# Patient Record
Sex: MALE | Race: WHITE
[De-identification: names, ages, dates, MRNs, and addresses within clinical notes are randomized per-mention and may not be internally consistent; named-entity substitution may affect disease eponyms.]

---

## 2017-09-08 ENCOUNTER — HOSPITAL ENCOUNTER (EMERGENCY)
Dept: HOSPITAL 8 - ED | Age: 42
Discharge: HOME | End: 2017-09-08
Payer: COMMERCIAL

## 2017-09-08 VITALS — SYSTOLIC BLOOD PRESSURE: 112 MMHG | DIASTOLIC BLOOD PRESSURE: 83 MMHG

## 2017-09-08 VITALS — WEIGHT: 132.28 LBS | BODY MASS INDEX: 21.26 KG/M2 | HEIGHT: 66 IN

## 2017-09-08 DIAGNOSIS — F10.10: ICD-10-CM

## 2017-09-08 DIAGNOSIS — M25.551: ICD-10-CM

## 2017-09-08 DIAGNOSIS — Z96.641: ICD-10-CM

## 2017-09-08 DIAGNOSIS — G89.29: Primary | ICD-10-CM

## 2017-09-08 PROCEDURE — 99283 EMERGENCY DEPT VISIT LOW MDM: CPT

## 2017-09-12 ENCOUNTER — HOSPITAL ENCOUNTER (EMERGENCY)
Dept: HOSPITAL 8 - ED | Age: 42
Discharge: HOME | End: 2017-09-12
Payer: SELF-PAY

## 2017-09-12 VITALS — SYSTOLIC BLOOD PRESSURE: 123 MMHG | DIASTOLIC BLOOD PRESSURE: 81 MMHG

## 2017-09-12 VITALS — HEIGHT: 68 IN | WEIGHT: 132.28 LBS | BODY MASS INDEX: 20.05 KG/M2

## 2017-09-12 DIAGNOSIS — M91.11: ICD-10-CM

## 2017-09-12 DIAGNOSIS — M25.551: ICD-10-CM

## 2017-09-12 DIAGNOSIS — G89.29: Primary | ICD-10-CM

## 2017-09-12 DIAGNOSIS — M25.562: ICD-10-CM

## 2017-09-12 DIAGNOSIS — M25.561: ICD-10-CM

## 2017-09-12 LAB
AST SERPL-CCNC: 42 U/L (ref 15–37)
BUN SERPL-MCNC: 14 MG/DL (ref 7–18)
HCT VFR BLD CALC: 40.7 % (ref 39.2–51.8)
HGB BLD-MCNC: 13.9 G/DL (ref 13.7–18)
WBC # BLD AUTO: 6.8 X10^3/UL (ref 3.4–10)

## 2017-09-12 PROCEDURE — 80307 DRUG TEST PRSMV CHEM ANLYZR: CPT

## 2017-09-12 PROCEDURE — 99285 EMERGENCY DEPT VISIT HI MDM: CPT

## 2017-09-12 PROCEDURE — 85730 THROMBOPLASTIN TIME PARTIAL: CPT

## 2017-09-12 PROCEDURE — 80053 COMPREHEN METABOLIC PANEL: CPT

## 2017-09-12 PROCEDURE — 85610 PROTHROMBIN TIME: CPT

## 2017-09-12 PROCEDURE — 85025 COMPLETE CBC W/AUTO DIFF WBC: CPT

## 2017-09-12 PROCEDURE — 36415 COLL VENOUS BLD VENIPUNCTURE: CPT

## 2017-09-14 ENCOUNTER — HOSPITAL ENCOUNTER (EMERGENCY)
Dept: HOSPITAL 8 - ED | Age: 42
Discharge: HOME | End: 2017-09-14
Payer: SELF-PAY

## 2017-09-14 VITALS — SYSTOLIC BLOOD PRESSURE: 133 MMHG | DIASTOLIC BLOOD PRESSURE: 81 MMHG

## 2017-09-14 VITALS — WEIGHT: 131.62 LBS | BODY MASS INDEX: 21.93 KG/M2 | HEIGHT: 65 IN

## 2017-09-14 DIAGNOSIS — F17.200: ICD-10-CM

## 2017-09-14 DIAGNOSIS — M25.551: Primary | ICD-10-CM

## 2017-09-14 DIAGNOSIS — Z96.641: ICD-10-CM

## 2017-09-14 DIAGNOSIS — M25.561: ICD-10-CM

## 2017-09-14 PROCEDURE — 99283 EMERGENCY DEPT VISIT LOW MDM: CPT

## 2017-09-15 ENCOUNTER — HOSPITAL ENCOUNTER (EMERGENCY)
Dept: HOSPITAL 8 - ED | Age: 42
Discharge: HOME | End: 2017-09-15
Payer: COMMERCIAL

## 2017-09-15 VITALS — DIASTOLIC BLOOD PRESSURE: 78 MMHG | SYSTOLIC BLOOD PRESSURE: 113 MMHG

## 2017-09-15 VITALS — WEIGHT: 130.51 LBS | HEIGHT: 66 IN | BODY MASS INDEX: 20.98 KG/M2

## 2017-09-15 DIAGNOSIS — Z59.0: ICD-10-CM

## 2017-09-15 DIAGNOSIS — Z96.641: ICD-10-CM

## 2017-09-15 DIAGNOSIS — G89.29: Primary | ICD-10-CM

## 2017-09-15 DIAGNOSIS — M25.551: ICD-10-CM

## 2017-09-15 PROCEDURE — 99282 EMERGENCY DEPT VISIT SF MDM: CPT

## 2017-09-15 SDOH — ECONOMIC STABILITY - HOUSING INSECURITY: HOMELESSNESS: Z59.0

## 2017-09-16 ENCOUNTER — HOSPITAL ENCOUNTER (EMERGENCY)
Dept: HOSPITAL 8 - ED | Age: 42
Discharge: LEFT BEFORE DISCHARGE | End: 2017-09-16
Payer: COMMERCIAL

## 2017-09-16 VITALS — HEIGHT: 66 IN | WEIGHT: 139.55 LBS | BODY MASS INDEX: 22.43 KG/M2

## 2017-09-16 VITALS — SYSTOLIC BLOOD PRESSURE: 107 MMHG | DIASTOLIC BLOOD PRESSURE: 75 MMHG

## 2017-09-16 DIAGNOSIS — M25.551: Primary | ICD-10-CM

## 2017-09-16 DIAGNOSIS — M79.651: ICD-10-CM

## 2017-09-16 DIAGNOSIS — Z96.641: ICD-10-CM

## 2017-09-16 DIAGNOSIS — M25.561: ICD-10-CM

## 2017-09-16 PROCEDURE — 99281 EMR DPT VST MAYX REQ PHY/QHP: CPT

## 2017-10-05 ENCOUNTER — HOSPITAL ENCOUNTER (EMERGENCY)
Dept: HOSPITAL 8 - ED | Age: 42
Discharge: HOME | End: 2017-10-05
Payer: MEDICAID

## 2017-10-05 VITALS — DIASTOLIC BLOOD PRESSURE: 98 MMHG | SYSTOLIC BLOOD PRESSURE: 164 MMHG

## 2017-10-05 DIAGNOSIS — G89.29: ICD-10-CM

## 2017-10-05 DIAGNOSIS — F41.1: Primary | ICD-10-CM

## 2017-10-05 DIAGNOSIS — Z59.0: ICD-10-CM

## 2017-10-05 DIAGNOSIS — F10.229: ICD-10-CM

## 2017-10-05 DIAGNOSIS — Z96.641: ICD-10-CM

## 2017-10-05 DIAGNOSIS — R51: ICD-10-CM

## 2017-10-05 DIAGNOSIS — M25.579: ICD-10-CM

## 2017-10-05 PROCEDURE — 99284 EMERGENCY DEPT VISIT MOD MDM: CPT

## 2017-10-05 SDOH — ECONOMIC STABILITY - HOUSING INSECURITY: HOMELESSNESS: Z59.0

## 2017-10-10 ENCOUNTER — HOSPITAL ENCOUNTER (EMERGENCY)
Facility: MEDICAL CENTER | Age: 42
End: 2017-10-10
Attending: EMERGENCY MEDICINE
Payer: COMMERCIAL

## 2017-10-10 VITALS
BODY MASS INDEX: 22.49 KG/M2 | HEIGHT: 65 IN | SYSTOLIC BLOOD PRESSURE: 113 MMHG | HEART RATE: 84 BPM | WEIGHT: 135 LBS | TEMPERATURE: 99.4 F | DIASTOLIC BLOOD PRESSURE: 70 MMHG | RESPIRATION RATE: 16 BRPM | OXYGEN SATURATION: 98 %

## 2017-10-10 DIAGNOSIS — G89.4 CHRONIC PAIN SYNDROME: ICD-10-CM

## 2017-10-10 DIAGNOSIS — Z59.00 HOMELESSNESS: ICD-10-CM

## 2017-10-10 PROCEDURE — 99284 EMERGENCY DEPT VISIT MOD MDM: CPT

## 2017-10-10 SDOH — ECONOMIC STABILITY - HOUSING INSECURITY: HOMELESSNESS UNSPECIFIED: Z59.00

## 2017-10-10 NOTE — ED NOTES
Chief Complaint   Patient presents with   • Leg Pain     right   • Hip Pain     right     Patient homeless, has multiple complaints. States he has left leg and hip pain. States he needs social work help. Patient denies SI and HI. VSS.

## 2017-10-11 NOTE — ED PROVIDER NOTES
"ED Provider Note    Scribed for Jailyn Bennett M.D. by Katherine Patiño. 10/10/2017  5:50 PM    Primary care provider: Pcp Pt States None  Means of arrival: walk-in   History obtained from: patient  History limited by: None      CHIEF COMPLAINT  Chief Complaint   Patient presents with   • Leg Pain     right   • Hip Pain     right     HPI  Julio C Nam is a 42 y.o. male who presents to the Emergency Department for chronic right leg pain. Patient is not complaining of leg pain at this time. Denies trauma to the leg. Denies alcohol use or drug use. Patient reports he is homeless and disabled and would like a social work help. Denies fever, chest pain, abdominal pain, shortness of breath.  He has no other complaints at this time.     REVIEW OF SYSTEMS  CARDIAC: no chest pain, no palpitations    GI: no vomiting, diarrhea, or abdominal pain   : no dysuria, back pain, or hematuria   Neuro: no weakness, numbness, aphasia, or headache  Musculoskeletal: chronic left leg pain, no swelling, deformity,  or joint swelling  Endocrine: no fevers, sweating, or weight loss   SKIN: no rash, erythema, or contusions     See history of present illness.     PAST MEDICAL HISTORY   has a past medical history of Depression; Psychiatric disorder; and Renal disorder.    SURGICAL HISTORY   has a past surgical history that includes other orthopedic surgery.    SOCIAL HISTORY  Social History   Substance Use Topics   • Smoking status: Current Every Day Smoker     Packs/day: 1.00     Types: Cigarettes      Comment: 4 cigarettes per day   • Alcohol use Yes      Comment: occasionally      History   Drug Use No     Comment: prior drug use per patient     FAMILY HISTORY  Patient does not report pertinent family history.     CURRENT MEDICATIONS  Patient does not report current medications.     ALLERGIES  No Known Allergies    PHYSICAL EXAM  VITAL SIGNS: /70   Pulse 84   Temp 37.4 °C (99.4 °F)   Resp 16   Ht 1.651 m (5' 5\")   Wt 61.2 kg " (135 lb)   SpO2 98%   BMI 22.47 kg/m²     Constitutional: unkept, No acute distress, Non-toxic appearance. Sleeping in no distress. On exam  HEENT: Normocephalic, Atraumatic,  external ears normal, pharynx pink,  Mucous  Membranes moist, No rhinorrhea or mucosal edema  Eyes: PERRL, EOMI, Conjunctiva normal, No discharge.   Cardiovascular: Regular Rate and Rhythm, No murmurs,  rubs, or gallops.   Thorax & Lungs: Lungs clear to auscultation bilaterally, No respiratory distress, No wheezes, rhales or rhonchi, No chest wall tenderness.   Abdomen: Bowel sounds normal, Soft, non tender, non distended,  No pulsatile masses., no rebound guarding or peritoneal signs.   Skin: Abrasion to left cheek, Warm, Dry, No erythema, No rash,   Extremities: Equal, intact distal pulses, No cyanosis, No tenderness.   Musculoskeletal: Hip pain is chronic, Good range of motion in all major joints. No tenderness to palpation or major deformities noted.   Neurologic: Alert & awake, no focal deficits  Psychiatric: Affect normal    COURSE & MEDICAL DECISION MAKING  Nursing notes, VS, PMSFHx reviewed in chart.     5:50 PM - Patient seen and examined at bedside. Patient continually falls asleep during evaluation. Patient does not have any complaints at this time. He requested a consult with social work as he is currently homeless.      The patient will return for new or worsening symptoms and is stable at the time of discharge.      DISPOSITION:  Patient will be discharged home in stable condition.    FOLLOW UP:  The patient is given a place at the homeless shelter [facility].  has seen him and he is stable for discharge.    OUTPATIENT MEDICATIONS:  None    FINAL IMPRESSION  1. Chronic pain syndrome    2. Homelessness          Katherine CALVO (Gareth), am scribing for, and in the presence of, Jailyn Bennett M.D..    Electronically signed by: Katherine Patiño (Gareth), 10/10/2017    Jailyn CALVO M.D. personally performed  the services described in this documentation, as scribed by Katherine Patiño in my presence, and it is both accurate and complete.    The note accurately reflects work and decisions made by me.  Jailyn Bennett  10/10/2017  9:13 PM

## 2017-10-11 NOTE — ED NOTES
Pt given resources and bus pass from social work. Pt given discharge instructions. Pt verbalized understanding. VSS no acute distress noted, pt able to ambulate with minimal difficulty.
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
## 2017-10-12 ENCOUNTER — HOSPITAL ENCOUNTER (EMERGENCY)
Dept: HOSPITAL 8 - ED | Age: 42
Discharge: LEFT BEFORE DISCHARGE | End: 2017-10-12
Payer: MEDICAID

## 2017-10-12 VITALS — BODY MASS INDEX: 22.66 KG/M2 | WEIGHT: 136.03 LBS | HEIGHT: 65 IN

## 2017-10-12 VITALS — SYSTOLIC BLOOD PRESSURE: 129 MMHG | DIASTOLIC BLOOD PRESSURE: 89 MMHG

## 2017-10-12 DIAGNOSIS — Z96.641: ICD-10-CM

## 2017-10-12 DIAGNOSIS — M25.551: ICD-10-CM

## 2017-10-12 DIAGNOSIS — G89.29: Primary | ICD-10-CM

## 2017-10-12 PROCEDURE — 99283 EMERGENCY DEPT VISIT LOW MDM: CPT

## 2017-10-12 PROCEDURE — 99281 EMR DPT VST MAYX REQ PHY/QHP: CPT

## 2017-10-13 ENCOUNTER — HOSPITAL ENCOUNTER (EMERGENCY)
Facility: MEDICAL CENTER | Age: 42
End: 2017-10-13
Attending: EMERGENCY MEDICINE
Payer: COMMERCIAL

## 2017-10-13 VITALS
RESPIRATION RATE: 19 BRPM | WEIGHT: 136.91 LBS | DIASTOLIC BLOOD PRESSURE: 82 MMHG | HEART RATE: 87 BPM | SYSTOLIC BLOOD PRESSURE: 122 MMHG | TEMPERATURE: 97.4 F | HEIGHT: 66 IN | OXYGEN SATURATION: 96 % | BODY MASS INDEX: 22 KG/M2

## 2017-10-13 DIAGNOSIS — Z76.5 DRUG-SEEKING BEHAVIOR: ICD-10-CM

## 2017-10-13 DIAGNOSIS — Z59.00 HOMELESSNESS: ICD-10-CM

## 2017-10-13 DIAGNOSIS — R46.89 OPPOSITIONAL DEFIANT BEHAVIOR: ICD-10-CM

## 2017-10-13 SDOH — ECONOMIC STABILITY - HOUSING INSECURITY: HOMELESSNESS UNSPECIFIED: Z59.00

## 2017-10-13 NOTE — DISCHARGE INSTRUCTIONS
Chronic Pain  Chronic pain can be defined as pain that is off and on and lasts for 3-6 months or longer. Many things cause chronic pain, which can make it difficult to make a diagnosis. There are many treatment options available for chronic pain. However, finding a treatment that works well for you may require trying various approaches until the right one is found. Many people benefit from a combination of two or more types of treatment to control their pain.  SYMPTOMS   Chronic pain can occur anywhere in the body and can range from mild to very severe. Some types of chronic pain include:  · Headache.  · Low back pain.  · Cancer pain.  · Arthritis pain.  · Neurogenic pain. This is pain resulting from damage to nerves.   People with chronic pain may also have other symptoms such as:  · Depression.  · Anger.  · Insomnia.  · Anxiety.  DIAGNOSIS   Your health care provider will help diagnose your condition over time. In many cases, the initial focus will be on excluding possible conditions that could be causing the pain. Depending on your symptoms, your health care provider may order tests to diagnose your condition. Some of these tests may include:   · Blood tests.    · CT scan.    · MRI.    · X-rays.    · Ultrasounds.    · Nerve conduction studies.    You may need to see a specialist.   TREATMENT   Finding treatment that works well may take time. You may be referred to a pain specialist. He or she may prescribe medicine or therapies, such as:   · Mindful meditation or yoga.  · Shots (injections) of numbing or pain-relieving medicines into the spine or area of pain.  · Local electrical stimulation.  · Acupuncture.    · Massage therapy.    · Aroma, color, light, or sound therapy.    · Biofeedback.    · Working with a physical therapist to keep from getting stiff.    · Regular, gentle exercise.    · Cognitive or behavioral therapy.    · Group support.    Sometimes, surgery may be recommended.   HOME CARE INSTRUCTIONS    · Take all medicines as directed by your health care provider.    · Lessen stress in your life by relaxing and doing things such as listening to calming music.    · Exercise or be active as directed by your health care provider.    · Eat a healthy diet and include things such as vegetables, fruits, fish, and lean meats in your diet.    · Keep all follow-up appointments with your health care provider.    · Attend a support group with others suffering from chronic pain.  SEEK MEDICAL CARE IF:   · Your pain gets worse.    · You develop a new pain that was not there before.    · You cannot tolerate medicines given to you by your health care provider.    · You have new symptoms since your last visit with your health care provider.    SEEK IMMEDIATE MEDICAL CARE IF:   · You feel weak.    · You have decreased sensation or numbness.    · You lose control of bowel or bladder function.    · Your pain suddenly gets much worse.    · You develop shaking.  · You develop chills.  · You develop confusion.  · You develop chest pain.  · You develop shortness of breath.    MAKE SURE YOU:  · Understand these instructions.  · Will watch your condition.  · Will get help right away if you are not doing well or get worse.     This information is not intended to replace advice given to you by your health care provider. Make sure you discuss any questions you have with your health care provider.     Document Released: 09/09/2003 Document Revised: 08/20/2014 Document Reviewed: 06/13/2014  [employer] Interactive Patient Education ©2016 [employer]
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
## 2017-10-13 NOTE — ED NOTES
Discharge instructions offered to patient; patient refused to accept them. Patient's VS WNL as of last reading. Patient refused to sign paperwork and was escorted out by security upon refusing to leave and yelling at MD and RN.

## 2018-02-11 ENCOUNTER — HOSPITAL ENCOUNTER (EMERGENCY)
Dept: HOSPITAL 8 - ED | Age: 43
Discharge: HOME | End: 2018-02-11
Payer: SELF-PAY

## 2018-02-11 VITALS — WEIGHT: 147.93 LBS | HEIGHT: 66 IN | BODY MASS INDEX: 23.77 KG/M2

## 2018-02-11 VITALS — SYSTOLIC BLOOD PRESSURE: 116 MMHG | DIASTOLIC BLOOD PRESSURE: 61 MMHG

## 2018-02-11 DIAGNOSIS — F41.9: ICD-10-CM

## 2018-02-11 DIAGNOSIS — Z00.00: ICD-10-CM

## 2018-02-11 DIAGNOSIS — Z59.0: ICD-10-CM

## 2018-02-11 DIAGNOSIS — F15.10: ICD-10-CM

## 2018-02-11 DIAGNOSIS — F10.229: ICD-10-CM

## 2018-02-11 DIAGNOSIS — F12.922: Primary | ICD-10-CM

## 2018-02-11 PROCEDURE — 99281 EMR DPT VST MAYX REQ PHY/QHP: CPT

## 2018-02-11 SDOH — ECONOMIC STABILITY - HOUSING INSECURITY: HOMELESSNESS: Z59.0

## 2018-02-14 ENCOUNTER — HOSPITAL ENCOUNTER (EMERGENCY)
Facility: MEDICAL CENTER | Age: 43
End: 2018-02-14
Payer: COMMERCIAL

## 2018-02-14 PROCEDURE — 302449 STATCHG TRIAGE ONLY (STATISTIC)

## 2018-02-15 ENCOUNTER — HOSPITAL ENCOUNTER (OUTPATIENT)
Dept: HOSPITAL 8 - ED | Age: 43
Setting detail: OBSERVATION
LOS: 1 days | Discharge: HOME | End: 2018-02-16
Attending: INTERNAL MEDICINE | Admitting: HOSPITALIST
Payer: COMMERCIAL

## 2018-02-15 VITALS — WEIGHT: 132.72 LBS | HEIGHT: 66 IN | BODY MASS INDEX: 21.33 KG/M2

## 2018-02-15 DIAGNOSIS — F23: Primary | ICD-10-CM

## 2018-02-15 DIAGNOSIS — R45.1: ICD-10-CM

## 2018-02-15 DIAGNOSIS — E87.1: ICD-10-CM

## 2018-02-15 DIAGNOSIS — Z72.89: ICD-10-CM

## 2018-02-15 DIAGNOSIS — G89.29: ICD-10-CM

## 2018-02-15 DIAGNOSIS — I25.9: ICD-10-CM

## 2018-02-15 DIAGNOSIS — F17.210: ICD-10-CM

## 2018-02-15 DIAGNOSIS — E87.6: ICD-10-CM

## 2018-02-15 DIAGNOSIS — E44.0: ICD-10-CM

## 2018-02-15 LAB
ALBUMIN SERPL-MCNC: 2.7 G/DL (ref 3.4–5)
ANION GAP SERPL CALC-SCNC: 10 MMOL/L (ref 5–15)
APAP SERPL-MCNC: < 2 MCG/ML (ref 10–30)
BASOPHILS # BLD AUTO: 0.05 X10^3/UL (ref 0–0.1)
BASOPHILS NFR BLD AUTO: 1 % (ref 0–1)
CALCIUM SERPL-MCNC: 8.2 MG/DL (ref 8.5–10.1)
CHLORIDE SERPL-SCNC: 102 MMOL/L (ref 98–107)
CREAT SERPL-MCNC: 0.52 MG/DL (ref 0.7–1.3)
EOSINOPHIL # BLD AUTO: 0.32 X10^3/UL (ref 0–0.4)
EOSINOPHIL NFR BLD AUTO: 4 % (ref 1–7)
ERYTHROCYTE [DISTWIDTH] IN BLOOD BY AUTOMATED COUNT: 15.3 % (ref 9.4–14.8)
LYMPHOCYTES # BLD AUTO: 2.25 X10^3/UL (ref 1–3.4)
LYMPHOCYTES NFR BLD AUTO: 25 % (ref 22–44)
MCH RBC QN AUTO: 31.7 PG (ref 27.5–34.5)
MCHC RBC AUTO-ENTMCNC: 33.7 G/DL (ref 33.2–36.2)
MCV RBC AUTO: 94.1 FL (ref 81–97)
MD: NO
MONOCYTES # BLD AUTO: 1.33 X10^3/UL (ref 0.2–0.8)
MONOCYTES NFR BLD AUTO: 15 % (ref 2–9)
NEUTROPHILS # BLD AUTO: 5.02 X10^3/UL (ref 1.8–6.8)
NEUTROPHILS NFR BLD AUTO: 56 % (ref 42–75)
PLATELET # BLD AUTO: 304 X10^3/UL (ref 130–400)
PMV BLD AUTO: 8 FL (ref 7.4–10.4)
RBC # BLD AUTO: 4.13 X10^6/UL (ref 4.38–5.82)
VANCOMYCIN TROUGH SERPL-MCNC: 2 MG/DL (ref 2.8–20)

## 2018-02-15 PROCEDURE — 83735 ASSAY OF MAGNESIUM: CPT

## 2018-02-15 PROCEDURE — G0378 HOSPITAL OBSERVATION PER HR: HCPCS

## 2018-02-15 PROCEDURE — 36415 COLL VENOUS BLD VENIPUNCTURE: CPT

## 2018-02-15 PROCEDURE — 80048 BASIC METABOLIC PNL TOTAL CA: CPT

## 2018-02-15 PROCEDURE — 82040 ASSAY OF SERUM ALBUMIN: CPT

## 2018-02-15 PROCEDURE — 96372 THER/PROPH/DIAG INJ SC/IM: CPT

## 2018-02-15 PROCEDURE — 85025 COMPLETE CBC W/AUTO DIFF WBC: CPT

## 2018-02-15 PROCEDURE — 80329 ANALGESICS NON-OPIOID 1 OR 2: CPT

## 2018-02-15 PROCEDURE — 99285 EMERGENCY DEPT VISIT HI MDM: CPT

## 2018-02-15 PROCEDURE — G0480 DRUG TEST DEF 1-7 CLASSES: HCPCS

## 2018-02-15 PROCEDURE — 80307 DRUG TEST PRSMV CHEM ANLYZR: CPT

## 2018-02-16 VITALS — DIASTOLIC BLOOD PRESSURE: 52 MMHG | SYSTOLIC BLOOD PRESSURE: 92 MMHG

## 2018-02-16 LAB
ANION GAP SERPL CALC-SCNC: 8 MMOL/L (ref 5–15)
BASOPHILS # BLD AUTO: 0.09 X10^3/UL (ref 0–0.1)
BASOPHILS NFR BLD AUTO: 1 % (ref 0–1)
CALCIUM SERPL-MCNC: 8.1 MG/DL (ref 8.5–10.1)
CHLORIDE SERPL-SCNC: 108 MMOL/L (ref 98–107)
CREAT SERPL-MCNC: 0.46 MG/DL (ref 0.7–1.3)
EOSINOPHIL # BLD AUTO: 0.16 X10^3/UL (ref 0–0.4)
EOSINOPHIL NFR BLD AUTO: 2 % (ref 1–7)
ERYTHROCYTE [DISTWIDTH] IN BLOOD BY AUTOMATED COUNT: 15.3 % (ref 9.4–14.8)
LYMPHOCYTES # BLD AUTO: 1.85 X10^3/UL (ref 1–3.4)
LYMPHOCYTES NFR BLD AUTO: 23 % (ref 22–44)
MCH RBC QN AUTO: 31.5 PG (ref 27.5–34.5)
MCHC RBC AUTO-ENTMCNC: 33.4 G/DL (ref 33.2–36.2)
MCV RBC AUTO: 94.4 FL (ref 81–97)
MD: NO
MONOCYTES # BLD AUTO: 1.16 X10^3/UL (ref 0.2–0.8)
MONOCYTES NFR BLD AUTO: 15 % (ref 2–9)
NEUTROPHILS # BLD AUTO: 4.72 X10^3/UL (ref 1.8–6.8)
NEUTROPHILS NFR BLD AUTO: 59 % (ref 42–75)
PLATELET # BLD AUTO: 326 X10^3/UL (ref 130–400)
PMV BLD AUTO: 8 FL (ref 7.4–10.4)
RBC # BLD AUTO: 4.31 X10^6/UL (ref 4.38–5.82)

## 2018-02-18 ENCOUNTER — HOSPITAL ENCOUNTER (EMERGENCY)
Dept: HOSPITAL 8 - ED | Age: 43
Discharge: LEFT BEFORE DISCHARGE | End: 2018-02-18
Payer: SELF-PAY

## 2018-02-18 VITALS — DIASTOLIC BLOOD PRESSURE: 73 MMHG | SYSTOLIC BLOOD PRESSURE: 108 MMHG

## 2018-02-18 VITALS — BODY MASS INDEX: 19.65 KG/M2 | WEIGHT: 117.95 LBS | HEIGHT: 65 IN

## 2018-02-18 DIAGNOSIS — M79.661: ICD-10-CM

## 2018-02-18 DIAGNOSIS — M79.662: Primary | ICD-10-CM

## 2018-02-18 PROCEDURE — 99281 EMR DPT VST MAYX REQ PHY/QHP: CPT

## 2018-02-22 ENCOUNTER — HOSPITAL ENCOUNTER (EMERGENCY)
Facility: MEDICAL CENTER | Age: 43
End: 2018-02-22
Attending: EMERGENCY MEDICINE

## 2018-02-22 VITALS
TEMPERATURE: 98 F | HEIGHT: 66 IN | BODY MASS INDEX: 22.82 KG/M2 | DIASTOLIC BLOOD PRESSURE: 78 MMHG | OXYGEN SATURATION: 97 % | WEIGHT: 142 LBS | HEART RATE: 76 BPM | SYSTOLIC BLOOD PRESSURE: 132 MMHG | RESPIRATION RATE: 16 BRPM

## 2018-02-22 DIAGNOSIS — Z59.00 HOMELESSNESS: ICD-10-CM

## 2018-02-22 DIAGNOSIS — Z76.5 MALINGERING: ICD-10-CM

## 2018-02-22 PROCEDURE — 99284 EMERGENCY DEPT VISIT MOD MDM: CPT

## 2018-02-22 SDOH — ECONOMIC STABILITY - HOUSING INSECURITY: HOMELESSNESS UNSPECIFIED: Z59.00

## 2018-02-22 ASSESSMENT — PAIN SCALES - GENERAL: PAINLEVEL_OUTOF10: 10

## 2018-02-22 NOTE — ED TRIAGE NOTES
"Julio C Nam  42 y.o.  Chief Complaint   Patient presents with   • Leg Pain     bilateral chronic       BIB EMS to triage for above. Patient was found at the police station, walked up 2 flights of stairs, then said \"my legs hurt. I can't walk anymore.\"    Patient was seen at [facility] for the same and was Toradol IM - states that it is wearing off and he needs more pain medication.    Patient states that he was kicked out of the homeless shelter and needs somewhere to sleep. Per EMS patient states \"I'm just going to tell them that I want to kill myself so I have somewhere to sleep.\"    Patient extremely aggressive, yelling and cursing at hospital staff during triage assessment.      Triage process explained to patient, apologized for wait time, and returned to lobby.  "

## 2018-02-22 NOTE — ED NOTES
"Discharge orders received from ERP. No new prescriptions received. Provided education and patient demonstrated understanding. Pt ambulatory off unit, yelling profanity about the \"lack of services he received.\" All personal belonging with patient. Security notified about pt.      "
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
## 2018-02-22 NOTE — ED NOTES
Pt to room by wheelchair, agree with triage note, pt has multiple complaints, rambling, chart up for ERP.